# Patient Record
Sex: FEMALE | Race: WHITE | NOT HISPANIC OR LATINO
[De-identification: names, ages, dates, MRNs, and addresses within clinical notes are randomized per-mention and may not be internally consistent; named-entity substitution may affect disease eponyms.]

---

## 2019-01-04 PROBLEM — Z00.00 ENCOUNTER FOR PREVENTIVE HEALTH EXAMINATION: Status: ACTIVE | Noted: 2019-01-04

## 2019-04-24 ENCOUNTER — APPOINTMENT (OUTPATIENT)
Dept: ENDOCRINOLOGY | Facility: CLINIC | Age: 49
End: 2019-04-24
Payer: COMMERCIAL

## 2019-04-24 VITALS
DIASTOLIC BLOOD PRESSURE: 80 MMHG | BODY MASS INDEX: 34.15 KG/M2 | WEIGHT: 200 LBS | HEART RATE: 100 BPM | SYSTOLIC BLOOD PRESSURE: 110 MMHG | HEIGHT: 64 IN

## 2019-04-24 PROCEDURE — 99203 OFFICE O/P NEW LOW 30 MIN: CPT

## 2019-04-24 NOTE — HISTORY OF PRESENT ILLNESS
[FreeTextEntry1] : April 24, 2019\par \par PCP:  Dr. Usha Craig\par          Surg:  Dr. Mainor Ponce\par \par CC:  Thyroid nodule (mother had thyroid surgery: benign)\par \par In course of evaluation for pain, noted on CT to have throid nodule.\par \par 2/12/19  TSH 1.05, Dr. Pugh arranged for FNAB \par \par \par 2/27/19:    FNAB 3 x 2.2 cm L thyroid nodule biopsy\par        Cytology:  Benign - [place] Cat II - paucicellular\par \par Impression:  Cytology reassuring; however, false negative cytology can be obtain more easily in larger nodules.\par So close surveillance is appropraite.  \par \par \par Plan:  Ultrasound again before next visit in six months.  Thank you.  -taya

## 2019-05-01 ENCOUNTER — RECORD ABSTRACTING (OUTPATIENT)
Age: 49
End: 2019-05-01

## 2019-05-01 DIAGNOSIS — Z87.42 PERSONAL HISTORY OF OTHER DISEASES OF THE FEMALE GENITAL TRACT: ICD-10-CM

## 2019-05-01 DIAGNOSIS — O09.822: ICD-10-CM

## 2019-05-01 DIAGNOSIS — O09.529 SUPERVISION OF ELDERLY MULTIGRAVIDA, UNSPECIFIED TRIMESTER: ICD-10-CM

## 2019-05-01 DIAGNOSIS — Z82.49 FAMILY HISTORY OF ISCHEMIC HEART DISEASE AND OTHER DISEASES OF THE CIRCULATORY SYSTEM: ICD-10-CM

## 2019-05-01 DIAGNOSIS — Z87.59 PERSONAL HISTORY OF OTHER COMPLICATIONS OF PREGNANCY, CHILDBIRTH AND THE PUERPERIUM: ICD-10-CM

## 2019-05-01 LAB — CYTOLOGY CVX/VAG DOC THIN PREP: NORMAL

## 2019-07-22 ENCOUNTER — LABORATORY RESULT (OUTPATIENT)
Age: 49
End: 2019-07-22

## 2019-07-22 ENCOUNTER — RESULT REVIEW (OUTPATIENT)
Age: 49
End: 2019-07-22

## 2019-08-13 ENCOUNTER — APPOINTMENT (OUTPATIENT)
Dept: ENDOCRINOLOGY | Facility: CLINIC | Age: 49
End: 2019-08-13
Payer: COMMERCIAL

## 2019-08-13 VITALS
SYSTOLIC BLOOD PRESSURE: 100 MMHG | WEIGHT: 208 LBS | HEIGHT: 64 IN | HEART RATE: 94 BPM | DIASTOLIC BLOOD PRESSURE: 80 MMHG | BODY MASS INDEX: 35.51 KG/M2

## 2019-08-13 DIAGNOSIS — Z78.9 OTHER SPECIFIED HEALTH STATUS: ICD-10-CM

## 2019-08-13 DIAGNOSIS — E04.1 NONTOXIC SINGLE THYROID NODULE: ICD-10-CM

## 2019-08-13 PROCEDURE — 99214 OFFICE O/P EST MOD 30 MIN: CPT

## 2019-08-13 NOTE — HISTORY OF PRESENT ILLNESS
[FreeTextEntry1] : August 13, 2019\par \par PCP:  Dr. Usha Craig\par          Surg:  Dr. Mainor Ponce\par \par CC:  Thyroid nodule (mother had thyroid surgery: benign)\par \par As noted previously, CT scan incidentally detected thyroid nodules.\par A dominant 3 cm L sided thyroid nodule was biopsied at [place] and was read as [place] II::\par \par FNAB  2/27/2019\par \par "FINAL DIAGNOSIS\par  \par Thyroid gland, left lobe; fine needle aspiration:\par \par BENIGN ([place] Category II).\par Smears are paucicellular showing rare clusters of follicular cells in a background of watery colloid. \par \par She went for a follow up ultrasound of the thyroid on\par 7/22/2019 and this was compared to 1/25/2019 study>  \par \par :"Right\par 0.4 x 0.3 x 0.4 cm hypoechoic nodule upper pole-new or newly imaged\par 0.7 x 0.5 x 0.7 cm hypoechoic nodule mid gland-grossly unchanged\par 0.3 x 0.2 x 0.3 cm hypoechoic nodule posteriorly mid gland-new or newly imaged\par Left\par 3.0 x 2.1 x 2.3 cm dominant nodule mid/lower pole-grossly unchanged\par 0.9 x 0.4 x 0.7 cm hypoechoic nodule mid gland-new or newly imaged\par 0.7 x 0.6 x 0.6 cm heterogeneous nodule left side of the isthmus-grossly unchanged\par \par \par \par IMPRESSION: A dominant nodule within the inferior pole of the left lobe is unchanged. Correlation with adequacy and histology from previous biopsy is recommended to determine whether repeat biopsy is necessary.\par New or newly imaged nodules are present in both lobes. A follow-up study in 6 months time to ensure their stability is recommended.\par \par \par ***Electronically Signed ***\par -----------------------------------------------\par Rasta Morin MD              07/22/19 "\par \par \par Plan:  I have asked her to return to [place] for a follow up US guided FNAB in an attempt to secure a better sample and she will request Dr. Thomson.    She will return here in January.  \par \par April 24, 2019\par \par PCP:  Dr. Usha Craig\par          Surg:  Dr. Mainor Ponce\par \par CC:  Thyroid nodule (mother had thyroid surgery: benign)\par \par In course of evaluation for pain, noted on CT to have throid nodule.\par \par 2/12/19  TSH 1.05, Dr. Pugh arranged for FNAB \par \par \par 2/27/19:    FNAB 3 x 2.2 cm L thyroid nodule biopsy\par        Cytology:  Benign - [place] Cat II - paucicellular\par \par Impression:  Cytology reassuring; however, false negative cytology can be obtain more easily in larger nodules.\par So close surveillance is appropraite.  \par \par \par Plan:  Ultrasound again before next visit in six months.  Thank you.  -taya

## 2019-08-13 NOTE — ASSESSMENT
[FreeTextEntry1] : ~\par Dominant 3 cm L thyroid nodule is MNG.\par FNAB was paucicellular.\par \par Will ask her to schedule a f/u FNAB.\par ROV January.

## 2019-09-19 ENCOUNTER — RESULT REVIEW (OUTPATIENT)
Age: 49
End: 2019-09-19

## 2019-09-20 ENCOUNTER — RESULT REVIEW (OUTPATIENT)
Age: 49
End: 2019-09-20

## 2019-10-01 ENCOUNTER — TRANSCRIPTION ENCOUNTER (OUTPATIENT)
Age: 49
End: 2019-10-01

## 2020-07-10 ENCOUNTER — APPOINTMENT (OUTPATIENT)
Dept: ENDOCRINOLOGY | Facility: CLINIC | Age: 50
End: 2020-07-10

## 2021-06-06 ENCOUNTER — NON-APPOINTMENT (OUTPATIENT)
Age: 51
End: 2021-06-06

## 2021-06-30 ENCOUNTER — NON-APPOINTMENT (OUTPATIENT)
Age: 51
End: 2021-06-30

## 2021-06-30 ENCOUNTER — APPOINTMENT (OUTPATIENT)
Dept: OBGYN | Facility: CLINIC | Age: 51
End: 2021-06-30
Payer: COMMERCIAL

## 2021-06-30 VITALS
SYSTOLIC BLOOD PRESSURE: 120 MMHG | WEIGHT: 203 LBS | HEIGHT: 64 IN | DIASTOLIC BLOOD PRESSURE: 70 MMHG | TEMPERATURE: 98.8 F | BODY MASS INDEX: 34.66 KG/M2

## 2021-06-30 DIAGNOSIS — Z01.419 ENCOUNTER FOR GYNECOLOGICAL EXAMINATION (GENERAL) (ROUTINE) W/OUT ABNORMAL FINDINGS: ICD-10-CM

## 2021-06-30 DIAGNOSIS — R92.2 INCONCLUSIVE MAMMOGRAM: ICD-10-CM

## 2021-06-30 PROCEDURE — ZZZZZ: CPT

## 2021-06-30 PROCEDURE — 99386 PREV VISIT NEW AGE 40-64: CPT

## 2021-06-30 PROCEDURE — 99396 PREV VISIT EST AGE 40-64: CPT

## 2021-06-30 PROCEDURE — 99072 ADDL SUPL MATRL&STAF TM PHE: CPT

## 2021-07-01 LAB — HPV HIGH+LOW RISK DNA PNL CVX: NOT DETECTED

## 2021-07-04 LAB — CYTOLOGY CVX/VAG DOC THIN PREP: NORMAL

## 2021-07-08 ENCOUNTER — RESULT REVIEW (OUTPATIENT)
Age: 51
End: 2021-07-08

## 2021-07-18 NOTE — HISTORY OF PRESENT ILLNESS
[TextBox_4] : 52yo P3 here for annual gyn- new to me.  She was a longtime [facility] pt- last visit 2017.\par She is s/p tubal during last c/s.\par Last period was 7/2020.  No menopausal symptoms.  Denies hot flashes, vaginal dryness.  No short term memory loss.\par No PMH but is overweight.  She has had to do thyroid biopsies which have been normal.  Follows with Dr. Hellerman.\par \par She is ; she has 3 children- ages 20, 18, and 3yo.  Works as a pre-.  Her daughter, 18 is here for first gyn today- going to [place] for college this fall.

## 2021-09-01 ENCOUNTER — APPOINTMENT (OUTPATIENT)
Dept: ENDOCRINOLOGY | Facility: CLINIC | Age: 51
End: 2021-09-01